# Patient Record
Sex: MALE | NOT HISPANIC OR LATINO
[De-identification: names, ages, dates, MRNs, and addresses within clinical notes are randomized per-mention and may not be internally consistent; named-entity substitution may affect disease eponyms.]

---

## 2019-02-01 ENCOUNTER — RX ONLY (OUTPATIENT)
Age: 64
Setting detail: RX ONLY
End: 2019-02-01

## 2021-03-21 ENCOUNTER — APPOINTMENT (OUTPATIENT)
Dept: DISASTER EMERGENCY | Facility: CLINIC | Age: 66
End: 2021-03-21

## 2021-03-21 DIAGNOSIS — Z01.818 ENCOUNTER FOR OTHER PREPROCEDURAL EXAMINATION: ICD-10-CM

## 2021-03-21 PROBLEM — Z00.00 ENCOUNTER FOR PREVENTIVE HEALTH EXAMINATION: Status: ACTIVE | Noted: 2021-03-21

## 2021-03-22 VITALS
SYSTOLIC BLOOD PRESSURE: 122 MMHG | HEART RATE: 81 BPM | DIASTOLIC BLOOD PRESSURE: 61 MMHG | HEIGHT: 71 IN | OXYGEN SATURATION: 100 %

## 2021-03-22 RX ORDER — CHLORHEXIDINE GLUCONATE 213 G/1000ML
1 SOLUTION TOPICAL ONCE
Refills: 0 | Status: DISCONTINUED | OUTPATIENT
Start: 2021-03-24 | End: 2021-04-07

## 2021-03-22 NOTE — H&P ADULT - HISTORY OF PRESENT ILLNESS
COVID  Pharmacy:  Cardiologist:  Escort:    Confirm meds upon arrival    Patient is a 64 y/o male with PMHx of HTN, HLD, and DM II  COVID  Pharmacy:  Cardiologist: Dr. Moreno  Escort:    Confirm meds upon arrival    Patient is a 64 y/o male with PMHx of HTN, HLD, and DM II who presented to cardiologist Dr. Moreno c/o SUERO.     Patient denies CP, SOB, palpitations, syncope, PND/orthopnea, or LE edema. In addition, patient denies fever, chills. N/V/D, recent illness, travel, or sick contacts.     NST 03/06/21: small defect during stress and a small defect during rest. It is partially reversible abnormality of mild intensity located in the basal anteroseptal and mid anteroseptal segments. Finding are consistent with ischemia of the LAD. EF 56% Medium defect during stress of moderate intensity. It is partially reversible abnormality located in the basal inferior and mid inferior segments, a mostly reversible perfusion abnormality located in the apical inferior segment. Findings are consistent with partial ischemia of the RCA.     In light of patient's risk factors, CCS ---anginal symptoms, and abnormal stress test patient patient referred for cardiac catheterization with possible intervention if clinically indicated to r/o CAD.  COVID testing @ [facility] 03/21  Pharmacy: [facility] between [place] and [facility]  Cardiologist: Dr. Moreno  Escort: needs transportation     Confirm meds upon arrival    Patient is a 66 y/o male with PMHx of HTN, HLD, anemia (unknown Hgb baseline) and prediabetes who presented to cardiologist Dr. Moreno c/o SUERO. Patient endorses worsening SUERO x1 year. He reports SUERO upon walking one block, which subsequently resolves with rest. Patient denies SOB, palpitations, syncope, PND/orthopnea, or LE edema. In addition, patient denies fever, chills. N/V/D, recent illness, travel, or sick contacts. NST 03/06/21: small defect during stress and a small defect during rest. It is partially reversible abnormality of mild intensity located in the basal anteroseptal and mid anteroseptal segments. Finding are consistent with ischemia of the LAD. EF 56% Medium defect during stress of moderate intensity. It is partially reversible abnormality located in the basal inferior and mid inferior segments, a mostly reversible perfusion abnormality located in the apical inferior segment. Findings are consistent with partial ischemia of the RCA.     In light of patient's risk factors, CCS III anginal equivalent symptoms, and abnormal stress test patient referred for cardiac catheterization with possible intervention if clinically indicated to r/o CAD.  COVID testing @ [facility] 03/21- NO SAMPLE AT CORE LAB, SWAB @ [facility] 3/24 (Req form sent)   Pharmacy: [facility] between [place] and [facility]  Cardiologist: Dr. Moreno  Escort: needs transportation     Confirm meds upon arrival    Patient is a 66 y/o male with PMHx of HTN, HLD, anemia (unknown Hgb baseline) and prediabetes who presented to cardiologist Dr. Moreno c/o SUERO. Patient endorses worsening SUERO x1 year. He reports SUERO upon walking one block, which subsequently resolves with rest. Patient denies SOB, palpitations, syncope, PND/orthopnea, or LE edema. In addition, patient denies fever, chills. N/V/D, recent illness, travel, or sick contacts. NST 03/06/21: small defect during stress and a small defect during rest. It is partially reversible abnormality of mild intensity located in the basal anteroseptal and mid anteroseptal segments. Finding are consistent with ischemia of the LAD. EF 56% Medium defect during stress of moderate intensity. It is partially reversible abnormality located in the basal inferior and mid inferior segments, a mostly reversible perfusion abnormality located in the apical inferior segment. Findings are consistent with partial ischemia of the RCA.     In light of patient's risk factors, CCS III anginal equivalent symptoms, and abnormal stress test patient referred for cardiac catheterization with possible intervention if clinically indicated to r/o CAD.  COVID testing @ [facility] 03/21- NO SAMPLE AT CORE LAB, SWAB @ [facility] 3/24 (Req form sent)   Pharmacy: [facility] between [place] and [address]  Cardiologist: Dr. Moreno  Escort: needs transportation     Patient is a 66 y/o male with PMHx of HTN, HLD, anemia (unknown Hgb baseline) and prediabetes who presented to cardiologist Dr. Moreno c/o SUERO. Patient endorses worsening SUERO x1 year. He reports SUERO upon walking one block, which subsequently resolves with rest. Patient denies SOB, palpitations, syncope, PND/orthopnea, or LE edema. In addition, patient denies fever, chills. N/V/D, recent illness, travel, or sick contacts. NST 03/06/21: small defect during stress and a small defect during rest. It is partially reversible abnormality of mild intensity located in the basal anteroseptal and mid anteroseptal segments. Finding are consistent with ischemia of the LAD. EF 56% Medium defect during stress of moderate intensity. It is partially reversible abnormality located in the basal inferior and mid inferior segments, a mostly reversible perfusion abnormality located in the apical inferior segment. Findings are consistent with partial ischemia of the RCA.     In light of patient's risk factors, CCS III anginal equivalent symptoms, and abnormal stress test patient referred for cardiac catheterization with possible intervention if clinically indicated to r/o CAD.

## 2021-03-22 NOTE — H&P ADULT - ASSESSMENT
Patient is a 64 y/o male with PMHx of HTN, HLD, anemia (unknown Hgb baseline) and prediabetes who In light of patient's risk factors, CCS III anginal equivalent symptoms, and abnormal stress test patient referred for cardiac catheterization with possible intervention if clinically indicated to r/o CAD.       ASA: II  Mallampati: II  H/H 13.4/42.2  Cr: 1.0 from [facility]     Patient loaded with Aspirin 325mg and plavix 600mg x 1 prior to procedure   patient is a suitable candidate for moderate sedation  Risks & benefits of procedure and alternative therapy have been explained to the patient including but not limited to: allergic reaction, bleeding w/possible need for blood transfusion, infection, renal and vascular compromise, limb damage, arrhythmia, stroke, vessel dissection/perforation, Myocardial infarction, emergent CABG. Informed consent obtained and in chart.

## 2021-03-24 ENCOUNTER — OUTPATIENT (OUTPATIENT)
Dept: OUTPATIENT SERVICES | Facility: HOSPITAL | Age: 66
LOS: 1 days | Discharge: HOME | End: 2021-03-24
Payer: MEDICARE

## 2021-03-24 LAB
A1C WITH ESTIMATED AVERAGE GLUCOSE RESULT: 6.7 % — HIGH (ref 4–5.6)
ALBUMIN SERPL ELPH-MCNC: 4.4 G/DL — SIGNIFICANT CHANGE UP (ref 3.3–5)
ALP SERPL-CCNC: 62 U/L — SIGNIFICANT CHANGE UP (ref 40–120)
ALT FLD-CCNC: 20 U/L — SIGNIFICANT CHANGE UP (ref 10–45)
ANION GAP SERPL CALC-SCNC: 11 MMOL/L — SIGNIFICANT CHANGE UP (ref 5–17)
APTT BLD: 34.5 SEC — SIGNIFICANT CHANGE UP (ref 27.5–35.5)
AST SERPL-CCNC: 22 U/L — SIGNIFICANT CHANGE UP (ref 10–40)
BASOPHILS # BLD AUTO: 0.01 K/UL — SIGNIFICANT CHANGE UP (ref 0–0.2)
BASOPHILS NFR BLD AUTO: 0.2 % — SIGNIFICANT CHANGE UP (ref 0–2)
BILIRUB SERPL-MCNC: 0.5 MG/DL — SIGNIFICANT CHANGE UP (ref 0.2–1.2)
BUN SERPL-MCNC: 20 MG/DL — SIGNIFICANT CHANGE UP (ref 7–23)
CALCIUM SERPL-MCNC: 10 MG/DL — SIGNIFICANT CHANGE UP (ref 8.4–10.5)
CHLORIDE SERPL-SCNC: 104 MMOL/L — SIGNIFICANT CHANGE UP (ref 96–108)
CHOLEST SERPL-MCNC: 128 MG/DL — SIGNIFICANT CHANGE UP
CK MB CFR SERPL CALC: 2 NG/ML — SIGNIFICANT CHANGE UP (ref 0–6.7)
CK SERPL-CCNC: 275 U/L — HIGH (ref 30–200)
CO2 SERPL-SCNC: 25 MMOL/L — SIGNIFICANT CHANGE UP (ref 22–31)
CREAT SERPL-MCNC: 0.96 MG/DL — SIGNIFICANT CHANGE UP (ref 0.5–1.3)
EOSINOPHIL # BLD AUTO: 0.03 K/UL — SIGNIFICANT CHANGE UP (ref 0–0.5)
EOSINOPHIL NFR BLD AUTO: 0.5 % — SIGNIFICANT CHANGE UP (ref 0–6)
ESTIMATED AVERAGE GLUCOSE: 146 MG/DL — HIGH (ref 68–114)
GLUCOSE SERPL-MCNC: 103 MG/DL — HIGH (ref 70–99)
HCT VFR BLD CALC: 42.2 % — SIGNIFICANT CHANGE UP (ref 39–50)
HDLC SERPL-MCNC: 51 MG/DL — SIGNIFICANT CHANGE UP
HGB BLD-MCNC: 13.4 G/DL — SIGNIFICANT CHANGE UP (ref 13–17)
IMM GRANULOCYTES NFR BLD AUTO: 0.2 % — SIGNIFICANT CHANGE UP (ref 0–1.5)
INR BLD: 1.1 — SIGNIFICANT CHANGE UP (ref 0.88–1.16)
LIPID PNL WITH DIRECT LDL SERPL: 65 MG/DL — SIGNIFICANT CHANGE UP
LYMPHOCYTES # BLD AUTO: 1.97 K/UL — SIGNIFICANT CHANGE UP (ref 1–3.3)
LYMPHOCYTES # BLD AUTO: 35.8 % — SIGNIFICANT CHANGE UP (ref 13–44)
MCHC RBC-ENTMCNC: 22.8 PG — LOW (ref 27–34)
MCHC RBC-ENTMCNC: 31.8 GM/DL — LOW (ref 32–36)
MCV RBC AUTO: 71.6 FL — LOW (ref 80–100)
MONOCYTES # BLD AUTO: 0.65 K/UL — SIGNIFICANT CHANGE UP (ref 0–0.9)
MONOCYTES NFR BLD AUTO: 11.8 % — SIGNIFICANT CHANGE UP (ref 2–14)
NEUTROPHILS # BLD AUTO: 2.84 K/UL — SIGNIFICANT CHANGE UP (ref 1.8–7.4)
NEUTROPHILS NFR BLD AUTO: 51.5 % — SIGNIFICANT CHANGE UP (ref 43–77)
NON HDL CHOLESTEROL: 77 MG/DL — SIGNIFICANT CHANGE UP
NRBC # BLD: 0 /100 WBCS — SIGNIFICANT CHANGE UP (ref 0–0)
PLATELET # BLD AUTO: 298 K/UL — SIGNIFICANT CHANGE UP (ref 150–400)
POTASSIUM SERPL-MCNC: 3.9 MMOL/L — SIGNIFICANT CHANGE UP (ref 3.5–5.3)
POTASSIUM SERPL-SCNC: 3.9 MMOL/L — SIGNIFICANT CHANGE UP (ref 3.5–5.3)
PROT SERPL-MCNC: 7.8 G/DL — SIGNIFICANT CHANGE UP (ref 6–8.3)
PROTHROM AB SERPL-ACNC: 13.1 SEC — SIGNIFICANT CHANGE UP (ref 10.6–13.6)
RBC # BLD: 5.89 M/UL — HIGH (ref 4.2–5.8)
RBC # FLD: 17.2 % — HIGH (ref 10.3–14.5)
SODIUM SERPL-SCNC: 140 MMOL/L — SIGNIFICANT CHANGE UP (ref 135–145)
TRIGL SERPL-MCNC: 62 MG/DL — SIGNIFICANT CHANGE UP
WBC # BLD: 5.51 K/UL — SIGNIFICANT CHANGE UP (ref 3.8–10.5)
WBC # FLD AUTO: 5.51 K/UL — SIGNIFICANT CHANGE UP (ref 3.8–10.5)

## 2021-03-24 PROCEDURE — 82553 CREATINE MB FRACTION: CPT

## 2021-03-24 PROCEDURE — 85730 THROMBOPLASTIN TIME PARTIAL: CPT

## 2021-03-24 PROCEDURE — 85025 COMPLETE CBC W/AUTO DIFF WBC: CPT

## 2021-03-24 PROCEDURE — 83036 HEMOGLOBIN GLYCOSYLATED A1C: CPT

## 2021-03-24 PROCEDURE — 80061 LIPID PANEL: CPT

## 2021-03-24 PROCEDURE — 93458 L HRT ARTERY/VENTRICLE ANGIO: CPT | Mod: 26

## 2021-03-24 PROCEDURE — 93010 ELECTROCARDIOGRAM REPORT: CPT

## 2021-03-24 PROCEDURE — 85610 PROTHROMBIN TIME: CPT

## 2021-03-24 PROCEDURE — 82550 ASSAY OF CK (CPK): CPT

## 2021-03-24 PROCEDURE — 93005 ELECTROCARDIOGRAM TRACING: CPT

## 2021-03-24 PROCEDURE — 93458 L HRT ARTERY/VENTRICLE ANGIO: CPT

## 2021-03-24 PROCEDURE — 99152 MOD SED SAME PHYS/QHP 5/>YRS: CPT

## 2021-03-24 PROCEDURE — C1887: CPT

## 2021-03-24 PROCEDURE — C1769: CPT

## 2021-03-24 PROCEDURE — 80053 COMPREHEN METABOLIC PANEL: CPT

## 2021-03-24 RX ORDER — SODIUM CHLORIDE 9 MG/ML
500 INJECTION INTRAMUSCULAR; INTRAVENOUS; SUBCUTANEOUS
Refills: 0 | Status: DISCONTINUED | OUTPATIENT
Start: 2021-03-24 | End: 2021-04-07

## 2021-03-24 RX ORDER — OLMESARTAN MEDOXOMIL 5 MG/1
1 TABLET, FILM COATED ORAL
Qty: 0 | Refills: 0 | DISCHARGE

## 2021-03-24 RX ORDER — AMLODIPINE BESYLATE 2.5 MG/1
1 TABLET ORAL
Qty: 0 | Refills: 0 | DISCHARGE

## 2021-03-24 RX ORDER — ASPIRIN/CALCIUM CARB/MAGNESIUM 324 MG
325 TABLET ORAL ONCE
Refills: 0 | Status: DISCONTINUED | OUTPATIENT
Start: 2021-03-24 | End: 2021-04-07

## 2021-03-24 RX ORDER — PREGABALIN 225 MG/1
2 CAPSULE ORAL
Qty: 0 | Refills: 0 | DISCHARGE

## 2021-03-24 RX ORDER — CLOPIDOGREL BISULFATE 75 MG/1
600 TABLET, FILM COATED ORAL ONCE
Refills: 0 | Status: DISCONTINUED | OUTPATIENT
Start: 2021-03-24 | End: 2021-04-07

## 2021-03-24 RX ORDER — ATORVASTATIN CALCIUM 80 MG/1
1 TABLET, FILM COATED ORAL
Qty: 0 | Refills: 0 | DISCHARGE

## 2021-03-24 RX ORDER — SODIUM CHLORIDE 9 MG/ML
500 INJECTION INTRAMUSCULAR; INTRAVENOUS; SUBCUTANEOUS
Refills: 0 | Status: DISCONTINUED | OUTPATIENT
Start: 2021-03-24 | End: 2021-03-24

## 2021-03-24 RX ORDER — METOPROLOL TARTRATE 50 MG
1 TABLET ORAL
Qty: 60 | Refills: 3
Start: 2021-03-24 | End: 2021-07-21

## 2021-03-24 NOTE — PROGRESS NOTE ADULT - SUBJECTIVE AND OBJECTIVE BOX
Interventional Cardiology PA SDA Discharge Note    Patient without complaints. Ambulated and voided without difficulties    Afebrile, VSS    Ext:    				Right           Radial :    hematoma,     bleeding, dressing; C/D/I      Pulses:    intact RAD to baseline     A/P:      Patient is a 66 y/o male with PMHx of HTN, HLD, anemia (unknown Hgb baseline) and prediabetes who presented to cardiologist Dr. Moreno c/o SUERO. Patient endorses worsening SUERO x1 year. He reports SUERO upon walking one block, which subsequently resolves with rest. Patient denies SOB, palpitations, syncope, PND/orthopnea, or LE edema. In addition, patient denies fever, chills. N/V/D, recent illness, travel, or sick contacts. NST 03/06/21: small defect during stress and a small defect during rest. It is partially reversible abnormality of mild intensity located in the basal anteroseptal and mid anteroseptal segments. Finding are consistent with ischemia of the LAD. EF 56% Medium defect during stress of moderate intensity. It is partially reversible abnormality located in the basal inferior and mid inferior segments, a mostly reversible perfusion abnormality located in the apical inferior segment. Findings are consistent with partial ischemia of the RCA. Pt is s/p cardiac cath 3/24/21: normal coronaries, moderate mLAD myocardial bridge. Pt will continue with medical management.               1.	Stable for discharge as per attending Dr. Rodriguez after bed rest, pt voids, wrist stable and 30 minutes of ambulation.  2.	Follow-up with PMD/Cardiologist Dr Moreno in 1-2 weeks  3.	Discharged forms signed and copies in chart    Interventional Cardiology PA SDA Discharge Note    Patient without complaints. Ambulated and voided without difficulties    Afebrile, VSS    Ext:    				Right           Radial :    hematoma,     bleeding, dressing; C/D/I      Pulses:    intact RAD to baseline     A/P:      Patient is a 64 y/o male with PMHx of HTN, HLD, anemia (unknown Hgb baseline) and prediabetes who presented to cardiologist Dr. Moreno c/o SUERO. Patient endorses worsening SUERO x1 year. He reports SUERO upon walking one block, which subsequently resolves with rest. Patient denies SOB, palpitations, syncope, PND/orthopnea, or LE edema. In addition, patient denies fever, chills. N/V/D, recent illness, travel, or sick contacts. NST 03/06/21: small defect during stress and a small defect during rest. It is partially reversible abnormality of mild intensity located in the basal anteroseptal and mid anteroseptal segments. Finding are consistent with ischemia of the LAD. EF 56% Medium defect during stress of moderate intensity. It is partially reversible abnormality located in the basal inferior and mid inferior segments, a mostly reversible perfusion abnormality located in the apical inferior segment. Findings are consistent with partial ischemia of the RCA. Pt is s/p cardiac cath 3/24/21: normal coronaries, moderate mLAD myocardial bridge. Pt will continue with medical management and prescription for lopressor 25 mg BID sent to pharmacy.               1.	Stable for discharge as per attending Dr. Rodriguez after bed rest, pt voids, wrist stable and 30 minutes of ambulation.  2.	Follow-up with PMD/Cardiologist Dr Moreno in 1-2 weeks  3.	Discharged forms signed and copies in chart

## 2021-03-26 DIAGNOSIS — E78.5 HYPERLIPIDEMIA, UNSPECIFIED: ICD-10-CM

## 2021-03-26 DIAGNOSIS — I10 ESSENTIAL (PRIMARY) HYPERTENSION: ICD-10-CM

## 2021-03-26 DIAGNOSIS — R06.02 SHORTNESS OF BREATH: ICD-10-CM

## 2021-03-26 DIAGNOSIS — I20.0 UNSTABLE ANGINA: ICD-10-CM

## 2021-03-26 DIAGNOSIS — R94.39 ABNORMAL RESULT OF OTHER CARDIOVASCULAR FUNCTION STUDY: ICD-10-CM

## 2022-06-22 RX ORDER — FAMOTIDINE 40 MG/1
TABLET, FILM COATED ORAL
COMMUNITY

## 2022-06-22 RX ORDER — MELOXICAM 15 MG/1
TABLET ORAL
COMMUNITY
Start: 2022-02-21 | End: 2022-08-19

## 2022-06-22 RX ORDER — TAMSULOSIN HYDROCHLORIDE 0.4 MG/1
CAPSULE ORAL
COMMUNITY
Start: 2022-02-21 | End: 2023-02-16

## 2022-06-22 RX ORDER — FLUTICASONE PROPIONATE 50 MCG
SPRAY, SUSPENSION (ML) NASAL
COMMUNITY

## 2024-09-23 ENCOUNTER — APPOINTMENT (RX ONLY)
Dept: URBAN - METROPOLITAN AREA CLINIC 20 | Facility: CLINIC | Age: 69
Setting detail: DERMATOLOGY
End: 2024-09-23

## 2024-09-23 DIAGNOSIS — L81.4 OTHER MELANIN HYPERPIGMENTATION: ICD-10-CM

## 2024-09-23 DIAGNOSIS — D18.0 HEMANGIOMA: ICD-10-CM

## 2024-09-23 DIAGNOSIS — L82.1 OTHER SEBORRHEIC KERATOSIS: ICD-10-CM

## 2024-09-23 DIAGNOSIS — L57.8 OTHER SKIN CHANGES DUE TO CHRONIC EXPOSURE TO NONIONIZING RADIATION: ICD-10-CM

## 2024-09-23 DIAGNOSIS — Z71.89 OTHER SPECIFIED COUNSELING: ICD-10-CM

## 2024-09-23 DIAGNOSIS — L57.0 ACTINIC KERATOSIS: ICD-10-CM

## 2024-09-23 PROBLEM — D18.01 HEMANGIOMA OF SKIN AND SUBCUTANEOUS TISSUE: Status: ACTIVE | Noted: 2024-09-23

## 2024-09-23 PROCEDURE — 17000 DESTRUCT PREMALG LESION: CPT

## 2024-09-23 PROCEDURE — 99213 OFFICE O/P EST LOW 20 MIN: CPT | Mod: 25

## 2024-09-23 PROCEDURE — 17003 DESTRUCT PREMALG LES 2-14: CPT

## 2024-09-23 PROCEDURE — ? LIQUID NITROGEN

## 2024-09-23 PROCEDURE — ? COUNSELING

## 2024-09-23 ASSESSMENT — LOCATION DETAILED DESCRIPTION DERM
LOCATION DETAILED: RIGHT LATERAL ABDOMEN
LOCATION DETAILED: LEFT SUPERIOR LATERAL UPPER BACK
LOCATION DETAILED: LEFT LATERAL SUPERIOR CHEST
LOCATION DETAILED: INFERIOR THORACIC SPINE
LOCATION DETAILED: RIGHT ANTERIOR PROXIMAL THIGH
LOCATION DETAILED: LEFT LATERAL DISTAL PRETIBIAL REGION
LOCATION DETAILED: RIGHT LATERAL SUPERIOR CHEST
LOCATION DETAILED: EPIGASTRIC SKIN
LOCATION DETAILED: LEFT ANTERIOR PROXIMAL THIGH
LOCATION DETAILED: LEFT LATERAL ABDOMEN
LOCATION DETAILED: LEFT VENTRAL DISTAL FOREARM
LOCATION DETAILED: RIGHT DISTAL CALF
LOCATION DETAILED: RIGHT PROXIMAL POSTERIOR UPPER ARM
LOCATION DETAILED: LEFT MEDIAL SUPERIOR CHEST
LOCATION DETAILED: LEFT DISTAL DORSAL FOREARM
LOCATION DETAILED: LEFT DISTAL CALF
LOCATION DETAILED: RIGHT DISTAL PRETIBIAL REGION
LOCATION DETAILED: RIGHT POSTERIOR SHOULDER
LOCATION DETAILED: RIGHT DISTAL DORSAL FOREARM
LOCATION DETAILED: INFERIOR MID FOREHEAD
LOCATION DETAILED: LEFT POSTERIOR SHOULDER
LOCATION DETAILED: RIGHT MID-UPPER BACK
LOCATION DETAILED: RIGHT FOREHEAD
LOCATION DETAILED: LEFT INFERIOR LATERAL UPPER BACK
LOCATION DETAILED: SUPERIOR LUMBAR SPINE
LOCATION DETAILED: RIGHT INFERIOR FOREHEAD
LOCATION DETAILED: LEFT PROXIMAL LATERAL POSTERIOR UPPER ARM
LOCATION DETAILED: PERIUMBILICAL SKIN

## 2024-09-23 ASSESSMENT — LOCATION SIMPLE DESCRIPTION DERM
LOCATION SIMPLE: LEFT UPPER ARM
LOCATION SIMPLE: LOWER BACK
LOCATION SIMPLE: INFERIOR FOREHEAD
LOCATION SIMPLE: CHEST
LOCATION SIMPLE: RIGHT FOREHEAD
LOCATION SIMPLE: LEFT THIGH
LOCATION SIMPLE: LEFT FOREARM
LOCATION SIMPLE: LEFT UPPER BACK
LOCATION SIMPLE: ABDOMEN
LOCATION SIMPLE: UPPER BACK
LOCATION SIMPLE: RIGHT SHOULDER
LOCATION SIMPLE: LEFT CALF
LOCATION SIMPLE: LEFT PRETIBIAL REGION
LOCATION SIMPLE: RIGHT UPPER BACK
LOCATION SIMPLE: LEFT SHOULDER
LOCATION SIMPLE: RIGHT THIGH
LOCATION SIMPLE: RIGHT FOREARM
LOCATION SIMPLE: RIGHT PRETIBIAL REGION
LOCATION SIMPLE: RIGHT UPPER ARM
LOCATION SIMPLE: RIGHT CALF

## 2024-09-23 ASSESSMENT — LOCATION ZONE DERM
LOCATION ZONE: LEG
LOCATION ZONE: ARM
LOCATION ZONE: TRUNK
LOCATION ZONE: FACE